# Patient Record
Sex: MALE | Race: BLACK OR AFRICAN AMERICAN | Employment: UNEMPLOYED | URBAN - METROPOLITAN AREA
[De-identification: names, ages, dates, MRNs, and addresses within clinical notes are randomized per-mention and may not be internally consistent; named-entity substitution may affect disease eponyms.]

---

## 2017-06-19 ENCOUNTER — APPOINTMENT (OUTPATIENT)
Dept: GENERAL RADIOLOGY | Age: 32
End: 2017-06-19
Attending: PHYSICIAN ASSISTANT
Payer: MEDICAID

## 2017-06-19 ENCOUNTER — HOSPITAL ENCOUNTER (EMERGENCY)
Age: 32
Discharge: HOME | End: 2017-06-19
Attending: EMERGENCY MEDICINE
Payer: MEDICAID

## 2017-06-19 VITALS
HEART RATE: 54 BPM | HEIGHT: 70 IN | BODY MASS INDEX: 19.04 KG/M2 | OXYGEN SATURATION: 99 % | WEIGHT: 133 LBS | RESPIRATION RATE: 12 BRPM | DIASTOLIC BLOOD PRESSURE: 79 MMHG | SYSTOLIC BLOOD PRESSURE: 114 MMHG | TEMPERATURE: 97.7 F

## 2017-06-19 DIAGNOSIS — S20.211A CONTUSION OF CHEST WALL, RIGHT, INITIAL ENCOUNTER: Primary | ICD-10-CM

## 2017-06-19 PROCEDURE — 74011250637 HC RX REV CODE- 250/637: Performed by: PHYSICIAN ASSISTANT

## 2017-06-19 PROCEDURE — 71101 X-RAY EXAM UNILAT RIBS/CHEST: CPT

## 2017-06-19 PROCEDURE — 99283 EMERGENCY DEPT VISIT LOW MDM: CPT

## 2017-06-19 RX ORDER — NAPROXEN 500 MG/1
500 TABLET ORAL 2 TIMES DAILY WITH MEALS
Qty: 20 TAB | Refills: 0 | Status: SHIPPED | OUTPATIENT
Start: 2017-06-19 | End: 2017-06-29

## 2017-06-19 RX ORDER — OXYCODONE AND ACETAMINOPHEN 5; 325 MG/1; MG/1
2 TABLET ORAL
Status: COMPLETED | OUTPATIENT
Start: 2017-06-19 | End: 2017-06-19

## 2017-06-19 RX ORDER — TRAMADOL HYDROCHLORIDE 50 MG/1
50 TABLET ORAL
Qty: 20 TAB | Refills: 0 | Status: SHIPPED | OUTPATIENT
Start: 2017-06-19 | End: 2018-05-15 | Stop reason: ALTCHOICE

## 2017-06-19 RX ORDER — ONDANSETRON 4 MG/1
4 TABLET, ORALLY DISINTEGRATING ORAL
Status: COMPLETED | OUTPATIENT
Start: 2017-06-19 | End: 2017-06-19

## 2017-06-19 RX ADMIN — OXYCODONE HYDROCHLORIDE AND ACETAMINOPHEN 2 TABLET: 5; 325 TABLET ORAL at 15:26

## 2017-06-19 RX ADMIN — ONDANSETRON 4 MG: 4 TABLET, ORALLY DISINTEGRATING ORAL at 15:26

## 2017-06-19 NOTE — ED NOTES
Emergency Department Nursing Plan of Care       The Nursing Plan of Care is developed from the Nursing assessment and Emergency Department Attending provider initial evaluation. The plan of care may be reviewed in the ED Provider note.     The Plan of Care was developed with the following considerations:   Patient / Family readiness to learn indicated by:verbalized understanding  Persons(s) to be included in education: patient  Barriers to Learning/Limitations:No    [address], RN    6/19/2017   2:45 PM    See Assessment

## 2017-06-19 NOTE — ED NOTES
Patient has been instructed that they have been given Percocet 2 Tabs PO* which contains opioids, benzodiazepines, or other sedating drugs. Patient is aware that they  will need to refrain from driving or operating heavy machinery after taking this medication. Patient also instructed that they need to avoid drinking alcohol and using other products containing opioids, benzodiazepines, or other sedating drugs. Patient verbalized understanding.

## 2017-06-19 NOTE — LETTER
[address] 
[facility] EMERGENCY DEPT 
[address] [place] [address] 
[phone] Work/School Note Date: 6/19/2017 To Whom It May concern: 
 
Suzanne Ramsey was seen and treated today in the emergency room by the following provider(s): 
Attending Provider: Lisette Tyson MD 
Physician Assistant: INOCENTE Zabala. Suzanne Ramsey may return to work on 06/22/2017. Sincerely, Caroline Thomas RN

## 2017-06-19 NOTE — ED PROVIDER NOTES
HPI     To ED for complaints of R chest pain. Started 2 days ago when playing basketball, was accidentally elbowed in the chest.  Since then has had increasing pain to right mid chest. Pain is sharp, constant, worse with deep breath, moving, twisting. Has not tried any medications for pain. No SOB, but cant take full inspiration due to resulting pain. No cough. No dizziness. No syncope. No other injury. Past Medical History:   Diagnosis Date    Anxiety     Bipolar 1 disorder ([facility]     Depression     Learning disorder     [facility]     Schizophrenia ([facility]        Past Surgical History:   Procedure Laterality Date    HX ORTHOPAEDIC      left foot         Family History:   Problem Relation Age of Onset    Diabetes Mother     Hypertension Mother     Psychiatric Disorder Mother     Mental Retardation Mother     Heart Disease Maternal Grandmother        Social History     Social History    Marital status: SINGLE     Spouse name: N/A    Number of children: N/A    Years of education: N/A     Occupational History    Not on file. Social History Main Topics    Smoking status: Current Every Day Smoker     Packs/day: 1.00     Types: Cigarettes    Smokeless tobacco: Never Used    Alcohol use No    Drug use: Yes     Special: Marijuana    Sexual activity: No     Other Topics Concern    Not on file     Social History Narrative         ALLERGIES: Review of patient's allergies indicates no known allergies. Review of Systems   Constitutional: Negative for chills and fever. HENT: Negative for congestion, rhinorrhea and sore throat. Eyes: Negative for pain and discharge. Respiratory: Negative for cough, chest tightness, shortness of breath and wheezing. Cardiovascular: Positive for chest pain. Negative for palpitations and leg swelling. Gastrointestinal: Negative for abdominal pain, nausea and vomiting. Genitourinary: Negative for dysuria, frequency and urgency. Musculoskeletal: Negative for back pain and neck pain. Skin: Negative for rash and wound. Neurological: Negative for seizures, syncope and headaches. Psychiatric/Behavioral: Negative for confusion. The patient is not nervous/anxious. All other systems reviewed and are negative. Vitals:    06/19/17 1403   BP: 117/75   Pulse: 70   Resp: 16   Temp: 98.1 °F (36.7 °C)   SpO2: 96%   Weight: 60.3 kg (133 lb)   Height: 5' 10\" (1.778 m)            Physical Exam   Constitutional: He is oriented to person, place, and time. He appears well-developed and well-nourished. HENT:   Head: Normocephalic and atraumatic. Right Ear: External ear normal.   Left Ear: External ear normal.   Nose: Nose normal.   Mouth/Throat: Oropharynx is clear and moist.   Eyes: Conjunctivae and EOM are normal. Pupils are equal, round, and reactive to light. Neck: Normal range of motion. Neck supple. Cardiovascular: Normal rate, regular rhythm and normal heart sounds. Pulmonary/Chest: Effort normal. He has no wheezes. He has no rales. Reproducible rib and chest tenderness to mid ribs, anterior, midclavicular line. No ecchymosis. No crepitus or palpable defect. No rash. Diminished BS bilaterally. Abdominal: Soft. Bowel sounds are normal. There is no tenderness. There is no rebound. Musculoskeletal: Normal range of motion. Neurological: He is alert and oriented to person, place, and time. He has normal reflexes. Skin: Skin is warm and dry. Psychiatric: He has a normal mood and affect. His behavior is normal.   Nursing note and vitals reviewed. MDM  Number of Diagnoses or Management Options  Contusion of chest wall, right, initial encounter:     ED Course       Procedures             Pt notes he is currently not taking any prescription medications. 350 PM - recheck - feeling better after medication    LABORATORY TESTS:  No results found for this or any previous visit (from the past 12 hour(s)).     IMAGING RESULTS:  XR RIBS RT W PA CXR MIN 3 V   Final Result          MEDICATIONS GIVEN:  Medications   oxyCODONE-acetaminophen (PERCOCET) 5-325 mg per tablet 2 Tab (2 Tabs Oral Given 17)   ondansetron (ZOFRAN ODT) tablet 4 mg (4 mg Oral Given 17 152)       IMPRESSION:  1. Contusion of chest wall, right, initial encounter        PLAN:  1. Current Discharge Medication List      START taking these medications    Details   naproxen (NAPROSYN) 500 mg tablet Take 1 Tab by mouth two (2) times daily (with meals) for 10 days. Qty: 20 Tab, Refills: 0      traMADol (ULTRAM) 50 mg tablet Take 1 Tab by mouth every six (6) hours as needed for Pain. Max Daily Amount: 200 mg.   Qty: 20 Tab, Refills: 0         STOP taking these medications       ibuprofen (MOTRIN) 800 mg tablet Comments:   Reason for Stopping:         HYDROcodone-acetaminophen (NORCO) 5-325 mg per tablet Comments:   Reason for Stopping:         LORazepam (ATIVAN) 0.5 mg tablet Comments:   Reason for Stoppin.   Follow-up Information     Follow up With Details Comments [address]   [address] [facility]   [address] [facility]  [phone]        Return to ED if worse

## 2017-06-19 NOTE — ED TRIAGE NOTES
Pt c/o right rib pain following playing basketball a week ago with shortness of breath. Pt states \"I feel like someone is poking a knife in my lung\".

## 2017-06-19 NOTE — DISCHARGE INSTRUCTIONS
Chest Contusion: Care Instructions  Your Care Instructions  A chest contusion, or bruise, is caused by a fall or direct blow to the chest. Car crashes, falls, getting punched, and injury from bicycle handlebars are common causes of chest contusions. A very forceful blow to the chest can injure the heart or blood vessels in the chest, the lungs, the airway, the liver, or the spleen. Pain may be caused by an injury to muscles, cartilage, or ribs. Deep breathing, coughing, or sneezing can increase your pain. Lying on the injured area also can cause pain. Follow-up care is a key part of your treatment and safety. Be sure to make and go to all appointments, and call your doctor if you are having problems. It's also a good idea to know your test results and keep a list of the medicines you take. How can you care for yourself at home? · Rest and protect the injured or sore area. Stop, change, or take a break from any activity that may be causing your pain. · Put ice or a cold pack on the area for 10 to 20 minutes at a time. Put a thin cloth between the ice and your skin. · After 2 or 3 days, if your swelling is gone, apply a heating pad set on low or a warm cloth to your chest. Some doctors suggest that you go back and forth between hot and cold. Put a thin cloth between the heating pad and your skin. · Do not wrap or tape your ribs for support. This may cause you to take smaller breaths, which could increase your risk of pneumonia and lung collapse. · Ask your doctor if you can take an over-the-counter pain medicine, such as acetaminophen (Tylenol), ibuprofen (Advil, Motrin), or naproxen (Aleve). Be safe with medicines. Read and follow all instructions on the label. · Take your medicines exactly as prescribed. Call your doctor if you think you are having a problem with your medicine.   · Gentle stretching and massage may help you feel better after a few days of rest. Stretch slowly to the point just before discomfort begins, then hold the stretch for at least 15 to 30 seconds. Do this 3 or 4 times per day. · As your pain gets better, slowly return to your normal activities. Be patient, because chest bruises can take weeks or months to heal. Any increased pain may be a sign that you need to rest a while longer. When should you call for help? Call 911 anytime you think you may need emergency care. For example, call if:  · You have severe trouble breathing. · You cough up blood. Call your doctor now or seek immediate medical care if:  · You have belly pain. · You are dizzy or lightheaded, or you feel like you may faint. · You develop new symptoms with the chest pain. · Your chest pain gets worse. · You have a fever. · You have some shortness of breath. · You have a cough that brings up mucus from the lungs. Watch closely for changes in your health, and be sure to contact your doctor if:  · Your chest pain is not improving after 1 week. Where can you learn more? Go to http://viola-beckie.info/. Enter I174 in the search box to learn more about \"Chest Contusion: Care Instructions. \"  Current as of: March 20, 2017  Content Version: 11.3  © 8569-5478 [employer]. Care instructions adapted under license by [employer] (which disclaims liability or warranty for this information). If you have questions about a medical condition or this instruction, always ask your healthcare professional. [place] [address] any warranty or liability for your use of this information.

## 2017-06-19 NOTE — ED NOTES
Attempted to assess patient a second time. Patient was not in room. Provider was notified. Triage was called. Did not receive answer from triage.

## 2017-06-19 NOTE — ED NOTES
Patient was in different room. Was showing in computer as being in room 9. Patient was actually in room 11.

## 2017-06-20 NOTE — ED NOTES
Patient (s)  given copy of dc instructions and 1 paper script(s) and 1 electronic scripts. Patient (s) verbalized understanding of instructions and script (s). Patient given a current medication reconciliation form and verbalized understanding of their medications. Patient (s) verbalized understanding of the importance of discussing medications with  his or her physician or clinic they will be following up with. Patient alert and oriented and in no acute distress. Patient offered wheelchair from treatment area to hospital entrance, patient declined wheelchair.

## 2018-05-15 ENCOUNTER — OFFICE VISIT (OUTPATIENT)
Dept: FAMILY MEDICINE CLINIC | Age: 33
End: 2018-05-15

## 2018-05-15 VITALS
HEIGHT: 71 IN | TEMPERATURE: 98.9 F | SYSTOLIC BLOOD PRESSURE: 117 MMHG | DIASTOLIC BLOOD PRESSURE: 72 MMHG | HEART RATE: 54 BPM | WEIGHT: 127.2 LBS | OXYGEN SATURATION: 99 % | BODY MASS INDEX: 17.81 KG/M2 | RESPIRATION RATE: 16 BRPM

## 2018-05-15 DIAGNOSIS — F31.60 BIPOLAR 1 DISORDER, MIXED (HCC): Primary | ICD-10-CM

## 2018-05-15 DIAGNOSIS — Z00.00 WELL ADULT EXAM: ICD-10-CM

## 2018-05-15 DIAGNOSIS — F32.A ANXIETY AND DEPRESSION: ICD-10-CM

## 2018-05-15 DIAGNOSIS — F41.9 ANXIETY AND DEPRESSION: ICD-10-CM

## 2018-05-15 DIAGNOSIS — M79.672 LEFT FOOT PAIN: ICD-10-CM

## 2018-05-15 DIAGNOSIS — F22 PARANOID BEHAVIOR (HCC): ICD-10-CM

## 2018-05-15 RX ORDER — MELOXICAM 15 MG/1
15 TABLET ORAL
Qty: 30 TAB | Refills: 1 | Status: SHIPPED | OUTPATIENT
Start: 2018-05-15

## 2018-05-15 RX ORDER — FLUOXETINE 10 MG/1
TABLET ORAL
Qty: 30 TAB | Refills: 1 | Status: SHIPPED | OUTPATIENT
Start: 2018-05-15 | End: 2018-08-27 | Stop reason: DRUGHIGH

## 2018-05-15 RX ORDER — QUETIAPINE FUMARATE 25 MG/1
25 TABLET, FILM COATED ORAL
Qty: 30 TAB | Refills: 1 | Status: SHIPPED | OUTPATIENT
Start: 2018-05-15 | End: 2018-08-27 | Stop reason: SDUPTHER

## 2018-05-15 NOTE — MR AVS SNAPSHOT
[address] [facility] 
[phone] Patient: Maria Luisa Hahn MRN: [id number] UES:9/24/1943 Visit Information Date & Time Provider Department Dept. Phone Encounter #  
 5/15/2018  2:30 PM Amara Gilliam MD [facility] at [address] [id number] Follow-up Instructions Return in about 6 weeks (around 6/26/2018). Upcoming Health Maintenance Date Due Pneumococcal 19-64 Medium Risk (1 of 1 - PPSV23) 11/22/2018* Influenza Age 5 to Adult 8/1/2018 DTaP/Tdap/Td series (2 - Td) 6/21/2024 *Topic was postponed. The date shown is not the original due date. Allergies as of 5/15/2018  Review Complete On: 5/15/2018 By: Amara Gilliam MD  
  
 Severity Noted Reaction Type Reactions Other Plant, Animal, Environmental  05/15/2018    Other (comments) Environmental Allergy-Stuffy Nose, Eyes Watery and Puffy Current Immunizations  Never Reviewed Name Date Hep B Vaccine 4/3/2015 Influenza Vaccine Intradermal PF 11/20/2014 Tdap 6/21/2014 Not reviewed this visit You Were Diagnosed With   
  
 Codes Comments Bipolar 1 disorder, mixed ([facility]    -  Primary ICD-10-CM: F31.60 ICD-9-CM: 296.60 Anxiety and depression     ICD-10-CM: F41.9, F32.9 ICD-9-CM: 300.00, 311 Paranoid behavior ([facility]     ICD-10-CM: F22 
ICD-9-CM: 297.8 Left foot pain     ICD-10-CM: D44.480 ICD-9-CM: 729.5 Well adult exam     ICD-10-CM: Z00.00 ICD-9-CM: V70.0 Vitals BP Pulse Temp Resp Height(growth percentile) Weight(growth percentile) 117/72 (BP 1 Location: Right arm, BP Patient Position: Sitting) (!) 54 98.9 °F (37.2 °C) (Oral) 16 5' 10.5\" (1.791 m) 127 lb 3.2 oz (57.7 kg) SpO2 BMI Smoking Status 99% 17.99 kg/m2 Current Every Day Smoker Vitals History BMI and BSA Data  Body Mass Index Body Surface Area  
 17.99 kg/m 2 1.69 m 2  
  
 Preferred Pharmacy Pharmacy Name Phone [facility] [address] [email or web address] [address] [facility] [[email or web address]] [phone] Your Updated Medication List  
  
   
This list is accurate as of 5/15/18  4:28 PM.  Always use your most recent med list.  
  
  
  
  
 FLUoxetine 10 mg tablet Commonly known as:  PROzac Take 1/2 tab by mouth daily x 1 week and then increase to 1 tab daily  
  
 meloxicam 15 mg tablet Commonly known as:  MOBIC Take 1 Tab by mouth daily (after breakfast). Take x 1 week and then stop. May use daily after this as needed. QUEtiapine 25 mg tablet Commonly known as:  SEROquel Take 1 Tab by mouth nightly. Prescriptions Sent to Pharmacy Refills  
 meloxicam (MOBIC) 15 mg tablet 1 Sig: Take 1 Tab by mouth daily (after breakfast). Take x 1 week and then stop. May use daily after this as needed. Class: Normal  
 Pharmacy: [facility] [address] AT [address] [place] [address] Ph #: [phone] Route: Oral  
 QUEtiapine (SEROQUEL) 25 mg tablet 1 Sig: Take 1 Tab by mouth nightly. Class: Normal  
 Pharmacy: [facility] [address] AT [address] [place] [address] Ph #: [phone] Route: Oral  
 FLUoxetine (PROZAC) 10 mg tablet 1 Sig: Take 1/2 tab by mouth daily x 1 week and then increase to 1 tab daily Class: Normal  
 Pharmacy: [facility] AT [address] [place] [address] Ph #: [phone] We Performed the Following 9-DRUG SCREEN + OXY, UR [id number] CPT(R)] METABOLIC PANEL, COMPREHENSIVE [37269 CPT(R)] REFERRAL TO PSYCHIATRY [REF91 Custom] Comments:  
 Please evaluate patient for: bipolar and schizophrenia hx  
 TSH 3RD GENERATION [88462 CPT(R)] Follow-up Instructions Return in about 6 weeks (around 6/26/2018). Referral Information Referral ID Referred By Referred To  
  
 [id number] Asia ARCE MD   
   [address] [place], [address] Phone: [phone] Fax: [phone] Visits Status Start Date End Date 1 New Request 5/15/18 5/15/19 If your referral has a status of pending review or denied, additional information will be sent to support the outcome of this decision. Introducing [facility] & HEALTH SERVICES! [employer] introduces RxVault.in patient portal. Now you can access parts of your medical record, email your doctor's office, and request medication refills online. 1. In your internet browser, go to [email or web address] 2. Click on the First Time User? Click Here link in the Sign In box. You will see the New Member Sign Up page. 3. Enter your RxVault.in Access Code exactly as it appears below. You will not need to use this code after youve completed the sign-up process. If you do not sign up before the expiration date, you must request a new code. · [email or web address] Access Code: [email or web address] Expires: 8/13/2018  4:28 PM 
 
4. Enter the last four digits of your Social Security Number (xxxx) and Date of Birth (mm/dd/yyyy) as indicated and click Submit. You will be taken to the next sign-up page. 5. Create a RxVault.in ID. This will be your RxVault.in login ID and cannot be changed, so think of one that is secure and easy to remember. 6. Create a RxVault.in password. You can change your password at any time. 7. Enter your Password Reset Question and Answer. This can be used at a later time if you forget your password. 8. Enter your e-mail address. You will receive e-mail notification when new information is available in [address]. 9. Click Sign Up. You can now view and download portions of your medical record. 10. Click the Download Summary menu link to download a portable copy of your medical information. If you have questions, please visit the Frequently Asked Questions section of the Food[employer] website. Remember, "[employer] is NOT to be used for urgent needs. For medical emergencies, dial 911. Now available from your iPhone and Android! Please provide this summary of care documentation to your next provider. Your primary care clinician is listed as Tiffanei Felder. If you have any questions after today's visit, please call [phone].
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
## 2018-05-15 NOTE — PROGRESS NOTES
Chief Complaint   Patient presents with    Establish Care     New Patient     Pneumonia Vaccine-when asked patient refused    Patient would like to prescribed medication for Mental  Problems-previous physician left practice/out of State  Room #8

## 2018-05-16 LAB
ALBUMIN SERPL-MCNC: 5.1 G/DL (ref 3.5–5.5)
ALBUMIN/GLOB SERPL: 1.8 {RATIO} (ref 1.2–2.2)
ALP SERPL-CCNC: 75 IU/L (ref 39–117)
ALT SERPL-CCNC: 14 IU/L (ref 0–44)
AMPHETAMINES UR QL SCN: NEGATIVE NG/ML
AST SERPL-CCNC: 20 IU/L (ref 0–40)
BARBITURATES UR QL SCN: NEGATIVE NG/ML
BENZODIAZ UR QL SCN: NEGATIVE NG/ML
BILIRUB SERPL-MCNC: 0.7 MG/DL (ref 0–1.2)
BUN SERPL-MCNC: 15 MG/DL (ref 6–20)
BUN/CREAT SERPL: 15 (ref 9–20)
BZE UR QL SCN: NEGATIVE NG/ML
CALCIUM SERPL-MCNC: 10 MG/DL (ref 8.7–10.2)
CANNABINOIDS UR QL SCN: POSITIVE NG/ML
CHLORIDE SERPL-SCNC: 97 MMOL/L (ref 96–106)
CO2 SERPL-SCNC: 24 MMOL/L (ref 18–29)
CREAT SERPL-MCNC: 1 MG/DL (ref 0.76–1.27)
CREAT UR-MCNC: 340.4 MG/DL (ref 20–300)
GFR SERPLBLD CREATININE-BSD FMLA CKD-EPI: 115 ML/MIN/1.73
GFR SERPLBLD CREATININE-BSD FMLA CKD-EPI: 99 ML/MIN/1.73
GLOBULIN SER CALC-MCNC: 2.9 G/DL (ref 1.5–4.5)
GLUCOSE SERPL-MCNC: 81 MG/DL (ref 65–99)
METHADONE UR QL SCN: NEGATIVE NG/ML
OPIATES UR QL SCN: NEGATIVE NG/ML
OXYCODONE+OXYMORPHONE UR QL SCN: NEGATIVE NG/ML
PCP UR QL: NEGATIVE NG/ML
PH UR: 5.6 [PH] (ref 4.5–8.9)
PLEASE NOTE:, 733163: ABNORMAL
POTASSIUM SERPL-SCNC: 4.1 MMOL/L (ref 3.5–5.2)
PROPOXYPH UR QL SCN: NEGATIVE NG/ML
PROT SERPL-MCNC: 8 G/DL (ref 6–8.5)
SODIUM SERPL-SCNC: 139 MMOL/L (ref 134–144)
TSH SERPL DL<=0.005 MIU/L-ACNC: 1.23 UIU/ML (ref 0.45–4.5)

## 2018-08-27 ENCOUNTER — OFFICE VISIT (OUTPATIENT)
Dept: FAMILY MEDICINE CLINIC | Age: 33
End: 2018-08-27

## 2018-08-27 VITALS
WEIGHT: 133.2 LBS | SYSTOLIC BLOOD PRESSURE: 129 MMHG | RESPIRATION RATE: 16 BRPM | TEMPERATURE: 97.9 F | HEART RATE: 54 BPM | DIASTOLIC BLOOD PRESSURE: 80 MMHG | OXYGEN SATURATION: 97 % | HEIGHT: 71 IN | BODY MASS INDEX: 18.65 KG/M2

## 2018-08-27 DIAGNOSIS — F32.A ANXIETY AND DEPRESSION: ICD-10-CM

## 2018-08-27 DIAGNOSIS — F22 PARANOID BEHAVIOR (HCC): ICD-10-CM

## 2018-08-27 DIAGNOSIS — F31.60 BIPOLAR 1 DISORDER, MIXED (HCC): ICD-10-CM

## 2018-08-27 DIAGNOSIS — F41.9 ANXIETY AND DEPRESSION: ICD-10-CM

## 2018-08-27 RX ORDER — FLUOXETINE HYDROCHLORIDE 20 MG/1
20 CAPSULE ORAL DAILY
Qty: 30 CAP | Refills: 2 | Status: SHIPPED | OUTPATIENT
Start: 2018-08-27 | End: 2018-11-12 | Stop reason: SDUPTHER

## 2018-08-27 RX ORDER — QUETIAPINE FUMARATE 50 MG/1
50 TABLET, FILM COATED ORAL
Qty: 30 TAB | Refills: 1 | Status: SHIPPED | OUTPATIENT
Start: 2018-08-27 | End: 2018-11-12 | Stop reason: SDUPTHER
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
## 2018-08-27 NOTE — MR AVS SNAPSHOT
[address] [facility] 
[phone] Patient: Sophia Kinney MRN: [id number] [facility]:0/78/1026 Visit Information Date & Time Provider Department Dept. Phone Encounter #  
 8/27/2018  9:30 AM Antonette Yee MD [facility] at [address] [id number] Follow-up Instructions Return in about 6 weeks (around 10/8/2018), or if symptoms worsen or fail to improve. Your Appointments 11/12/2018  1:00 PM  
New Patient with Tigre Riojas MD  
[facility]) Appt Note: new pt for bipolar 1 disorder; referred by Wilda Jhaveri; pt  made appt; told to arrive at 12:30  
 [address] [facility] [address] [facility] [address]  
  
   
 [address] [place] [address] [address] Upcoming Health Maintenance Date Due Pneumococcal 19-64 Medium Risk (1 of 1 - PPSV23) 11/22/2018* Influenza Age 5 to Adult 12/15/2018* DTaP/Tdap/Td series (2 - Td) 6/21/2024 *Topic was postponed. The date shown is not the original due date. Allergies as of 8/27/2018  Review Complete On: 8/27/2018 By: Antonette Yee MD  
  
 Severity Noted Reaction Type Reactions Other Plant, Animal, Environmental  05/15/2018    Other (comments) Environmental Allergy-Stuffy Nose, Eyes Watery and Puffy Current Immunizations  Never Reviewed Name Date Hep B Vaccine 4/3/2015 Influenza Vaccine Intradermal PF 11/20/2014 Tdap 6/21/2014 Not reviewed this visit You Were Diagnosed With   
  
 Codes Comments Bipolar 1 disorder, mixed ([facility] [address]     ICD-10-CM: F31.60 ICD-9-CM: 296.60 Anxiety and depression     ICD-10-CM: F41.9, F32.9 ICD-9-CM: 300.00, 311 Paranoid behavior ([facility] [address]     ICD-10-CM: F22 
ICD-9-CM: 297.8 Vitals BP Pulse Temp Resp Height(growth percentile) Weight(growth percentile) 129/80 (BP 1 Location: Right arm, BP Patient Position: Sitting) (!) 54 97.9 °F (36.6 °C) (Oral) 16 5' 10.5\" (1.791 m) 133 lb 3.2 oz (60.4 kg) SpO2 BMI Smoking Status 97% 18.84 kg/m2 Current Every Day Smoker Vitals History BMI and BSA Data Body Mass Index Body Surface Area  
 18.84 kg/m 2 1.73 m 2 Preferred Pharmacy Pharmacy Name Phone [facility] [address] [facility] [phone] Your Updated Medication List  
  
   
This list is accurate as of 8/27/18 10:39 AM.  Always use your most recent med list.  
  
  
  
  
 FLUoxetine 20 mg capsule Commonly known as:  PROzac Take 1 Cap by mouth daily. meloxicam 15 mg tablet Commonly known as:  MOBIC Take 1 Tab by mouth daily (after breakfast). Take x 1 week and then stop. May use daily after this as needed. QUEtiapine 50 mg tablet Commonly known as:  SEROquel Take 1 Tab by mouth nightly. Prescriptions Sent to Pharmacy Refills QUEtiapine (SEROQUEL) 50 mg tablet 1 Sig: Take 1 Tab by mouth nightly. Class: Normal  
 Pharmacy: [facility] [place] [address] Ph #: [phone] Route: Oral  
 FLUoxetine (PROZAC) 20 mg capsule 2 Sig: Take 1 Cap by mouth daily. Class: Normal  
 Pharmacy: [facility] [place] [address] Ph #: [phone] Route: Oral  
  
Follow-up Instructions Return in about 6 weeks (around 10/8/2018), or if symptoms worsen or fail to improve. Introducing [facility] & HEALTH SERVICES! [facility] introduces Webs patient portal. Now you can access parts of your medical record, email your doctor's office, and request medication refills online. 1. In your internet browser, go to [email or web address] 2. Click on the First Time User? Click Here link in the Sign In box. You will see the New Member Sign Up page. 3. Enter your Muse Access Code exactly as it appears below. You will not need to use this code after youve completed the sign-up process. If you do not sign up before the expiration date, you must request a new code. · Muse Access Code: [email or web address] Expires: 11/25/2018  9:56 AM 
 
4. Enter the last four digits of your Social Security Number (xxxx) and Date of Birth (mm/dd/yyyy) as indicated and click Submit. You will be taken to the next sign-up page. 5. Create a Muse ID. This will be your Muse login ID and cannot be changed, so think of one that is secure and easy to remember. 6. Create a Muse password. You can change your password at any time. 7. Enter your Password Reset Question and Answer. This can be used at a later time if you forget your password. 8. Enter your e-mail address. You will receive e-mail notification when new information is available in [address] 9. Click Sign Up. You can now view and download portions of your medical record. 10. Click the Download Summary menu link to download a portable copy of your medical information. If you have questions, please visit the Frequently Asked Questions section of the Muse website. Remember, Muse is NOT to be used for urgent needs. For medical emergencies, dial 911. Now available from your iPhone and Android! Please provide this summary of care documentation to your next provider. Your primary care clinician is listed as Lucio Griffith. If you have any questions after today's visit, please call [phone].

## 2018-08-27 NOTE — PROGRESS NOTES
Chief Complaint   Patient presents with    Bipolar     3 month follow-up   1. Have you been to the ER, urgent care clinic since your last visit? Hospitalized since your last visit? No    2. Have you seen or consulted any other health care providers outside of the [address] [place] [address] since your last visit? Include any pap smears or colon screening.  No   Appointment with Dr Freddy Garcia scheduled 11/2/18  Room 8

## 2018-11-12 ENCOUNTER — OFFICE VISIT (OUTPATIENT)
Dept: BEHAVIORAL/MENTAL HEALTH CLINIC | Age: 33
End: 2018-11-12

## 2018-11-12 VITALS
HEIGHT: 71 IN | SYSTOLIC BLOOD PRESSURE: 127 MMHG | BODY MASS INDEX: 19.35 KG/M2 | HEART RATE: 75 BPM | DIASTOLIC BLOOD PRESSURE: 99 MMHG | WEIGHT: 138.2 LBS

## 2018-11-12 DIAGNOSIS — F20.0 PARANOID SCHIZOPHRENIA (HCC): ICD-10-CM

## 2018-11-12 DIAGNOSIS — F41.9 ANXIETY AND DEPRESSION: ICD-10-CM

## 2018-11-12 DIAGNOSIS — F81.9 LEARNING DISORDER: Primary | ICD-10-CM

## 2018-11-12 DIAGNOSIS — F32.A ANXIETY AND DEPRESSION: ICD-10-CM

## 2018-11-12 PROBLEM — F20.9 SCHIZOPHRENIA (HCC): Status: ACTIVE | Noted: 2018-11-12

## 2018-11-12 RX ORDER — FLUOXETINE HYDROCHLORIDE 20 MG/1
20 CAPSULE ORAL
Qty: 30 CAP | Refills: 2 | Status: SHIPPED | OUTPATIENT
Start: 2018-11-12

## 2018-11-12 RX ORDER — DOXEPIN HYDROCHLORIDE 25 MG/1
25 CAPSULE ORAL
Qty: 30 CAP | Refills: 1 | Status: SHIPPED | OUTPATIENT
Start: 2018-11-12

## 2018-11-12 RX ORDER — QUETIAPINE FUMARATE 100 MG/1
100 TABLET, FILM COATED ORAL
Qty: 30 TAB | Refills: 1 | Status: SHIPPED | OUTPATIENT
Start: 2018-11-12
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
## 2018-12-28 ENCOUNTER — HOSPITAL ENCOUNTER (EMERGENCY)
Age: 33
Discharge: HOME | End: 2018-12-28
Attending: EMERGENCY MEDICINE
Payer: MEDICAID

## 2018-12-28 VITALS
RESPIRATION RATE: 16 BRPM | TEMPERATURE: 98.2 F | DIASTOLIC BLOOD PRESSURE: 76 MMHG | OXYGEN SATURATION: 98 % | HEART RATE: 83 BPM | BODY MASS INDEX: 19.26 KG/M2 | WEIGHT: 130 LBS | SYSTOLIC BLOOD PRESSURE: 130 MMHG | HEIGHT: 69 IN

## 2018-12-28 DIAGNOSIS — N30.00 ACUTE CYSTITIS WITHOUT HEMATURIA: Primary | ICD-10-CM

## 2018-12-28 LAB
APPEARANCE UR: ABNORMAL
BACTERIA URNS QL MICRO: NEGATIVE /HPF
BILIRUB UR QL: NEGATIVE
COLOR UR: ABNORMAL
EPITH CASTS URNS QL MICRO: ABNORMAL /LPF
GLUCOSE UR STRIP.AUTO-MCNC: NEGATIVE MG/DL
HGB UR QL STRIP: NEGATIVE
KETONES UR QL STRIP.AUTO: NEGATIVE MG/DL
LEUKOCYTE ESTERASE UR QL STRIP.AUTO: ABNORMAL
NITRITE UR QL STRIP.AUTO: NEGATIVE
PH UR STRIP: 6.5 [PH] (ref 5–8)
PROT UR STRIP-MCNC: NEGATIVE MG/DL
RBC #/AREA URNS HPF: ABNORMAL /HPF (ref 0–5)
SP GR UR REFRACTOMETRY: 1.02 (ref 1–1.03)
UA: UC IF INDICATED,UAUC: ABNORMAL
UROBILINOGEN UR QL STRIP.AUTO: 1 EU/DL (ref 0.2–1)
WBC URNS QL MICRO: ABNORMAL /HPF (ref 0–4)

## 2018-12-28 PROCEDURE — 81001 URINALYSIS AUTO W/SCOPE: CPT

## 2018-12-28 PROCEDURE — 99283 EMERGENCY DEPT VISIT LOW MDM: CPT

## 2018-12-28 PROCEDURE — 96372 THER/PROPH/DIAG INJ SC/IM: CPT

## 2018-12-28 PROCEDURE — 87086 URINE CULTURE/COLONY COUNT: CPT

## 2018-12-28 PROCEDURE — 87491 CHLMYD TRACH DNA AMP PROBE: CPT

## 2018-12-28 PROCEDURE — 74011250636 HC RX REV CODE- 250/636: Performed by: PHYSICIAN ASSISTANT

## 2018-12-28 PROCEDURE — 74011250637 HC RX REV CODE- 250/637: Performed by: PHYSICIAN ASSISTANT

## 2018-12-28 RX ORDER — CEPHALEXIN 500 MG/1
500 CAPSULE ORAL 2 TIMES DAILY
Qty: 14 CAP | Refills: 0 | Status: SHIPPED | OUTPATIENT
Start: 2018-12-28 | End: 2019-01-04

## 2018-12-28 RX ORDER — AZITHROMYCIN 500 MG/1
1000 TABLET, FILM COATED ORAL
Status: COMPLETED | OUTPATIENT
Start: 2018-12-28 | End: 2018-12-28

## 2018-12-28 RX ADMIN — LIDOCAINE HYDROCHLORIDE 250 MG: 10 INJECTION, SOLUTION EPIDURAL; INFILTRATION; INTRACAUDAL; PERINEURAL at 16:29

## 2018-12-28 RX ADMIN — AZITHROMYCIN 1000 MG: 500 TABLET, FILM COATED ORAL at 16:29

## 2018-12-28 NOTE — ED PROVIDER NOTES
EMERGENCY DEPARTMENT HISTORY AND PHYSICAL EXAM 
 
 
Date: 12/28/2018 Patient Name: Nyla Anderson History of Presenting Illness Chief Complaint Patient presents with  Penile Discharge History Provided By: Patient HPI: Nyla Anderson, 29 y.o. male with PMHx significant for depression, anxiety, bipolar ds, schizophrenia, ADHD presents ambulatory to the ED with cc of white penile discharge with assoc dysuria x 2 days. Denies hematuria, genital lesions Admits to unprotected intercourse. Has not taken anything for sx. There are no other complaints, changes, or physical findings at this time. PCP: Tejal Velazquez MD 
 
No current facility-administered medications on file prior to encounter. Current Outpatient Medications on File Prior to Encounter Medication Sig Dispense Refill  QUEtiapine (SEROQUEL) 100 mg tablet Take 1 Tab by mouth nightly. 30 Tab 1  
 FLUoxetine (PROZAC) 20 mg capsule Take 1 Cap by mouth daily (after breakfast). 30 Cap 2  
 doxepin (SINEQUAN) 25 mg capsule Take 1 Cap by mouth nightly. 30 Cap 1  
 meloxicam (MOBIC) 15 mg tablet Take 1 Tab by mouth daily (after breakfast). Take x 1 week and then stop. May use daily after this as needed. 30 Tab 1 Past History Past Medical History: 
Past Medical History:  
Diagnosis Date  ADHD   
 diagnosed at age 8yo  Anxiety  Bipolar 1 disorder ([address]  Depression  Learning disorder  [facility] [facility]  Schizophrenia ([facility] [address] Past Surgical History: 
Past Surgical History:  
Procedure Laterality Date  HX ORTHOPAEDIC    
 left foot Family History: 
Family History Problem Relation Age of Onset  Diabetes Mother  Hypertension Mother  Psychiatric Disorder Mother  Mental Retardation Mother  Heart Disease Maternal Grandmother  Hypertension Father  No Known Problems Sister  No Known Problems Brother  No Known Problems Sister Social History: 
Social History Tobacco Use  Smoking status: Current Every Day Smoker Packs/day: 1.00 Types: Cigarettes  Smokeless tobacco: Never Used Substance Use Topics  Alcohol use: No  
 Drug use: No  
 
 
Allergies: Allergies Allergen Reactions  Other Plant, Animal, Environmental Other (comments) Environmental Allergy-Stuffy Nose, Eyes Watery and Puffy Review of Systems Review of Systems Constitutional: Negative for chills and fever. HENT: Negative for facial swelling. Eyes: Negative for photophobia and visual disturbance. Respiratory: Negative for shortness of breath. Cardiovascular: Negative for chest pain. Gastrointestinal: Negative for abdominal pain, nausea and vomiting. Genitourinary: Positive for discharge and dysuria. Negative for flank pain, frequency, genital sores, hematuria, penile pain and penile swelling. Skin: Negative for color change, pallor, rash and wound. Neurological: Negative for dizziness, weakness, light-headedness and headaches. All other systems reviewed and are negative. Physical Exam  
Physical Exam  
Constitutional: He is oriented to person, place, and time. He appears well-developed and well-nourished. No distress. HENT:  
Head: Normocephalic and atraumatic. Eyes: Conjunctivae are normal.  
Cardiovascular: Normal rate, regular rhythm and normal heart sounds. Pulmonary/Chest: Effort normal and breath sounds normal. No respiratory distress. Abdominal: Soft. Bowel sounds are normal. There is no tenderness. Musculoskeletal: Normal range of motion. Neurological: He is alert and oriented to person, place, and time. No cranial nerve deficit. Skin: Skin is warm. No rash noted. Psychiatric: He has a normal mood and affect. His behavior is normal.  
Nursing note and vitals reviewed. Diagnostic Study Results Labs - Recent Results (from the past 12 hour(s)) URINALYSIS W/ REFLEX CULTURE  
 Collection Time: 12/28/18  4:23 PM  
Result Value Ref Range Color YELLOW/STRAW Appearance CLOUDY (A) CLEAR Specific gravity 1.025 1.003 - 1.030    
 pH (UA) 6.5 5.0 - 8.0 Protein NEGATIVE  NEG mg/dL Glucose NEGATIVE  NEG mg/dL Ketone NEGATIVE  NEG mg/dL Bilirubin NEGATIVE  NEG Blood NEGATIVE  NEG Urobilinogen 1.0 0.2 - 1.0 EU/dL Nitrites NEGATIVE  NEG Leukocyte Esterase LARGE (A) NEG    
 WBC  0 - 4 /hpf  
 RBC 0-5 0 - 5 /hpf Epithelial cells MODERATE (A) FEW /lpf Bacteria NEGATIVE  NEG /hpf  
 UA:UC IF INDICATED URINE CULTURE ORDERED (A) CNI Radiologic Studies - No orders to display CT Results  (Last 48 hours) None CXR Results  (Last 48 hours) None Medical Decision Making I am the first provider for this patient. I reviewed the vital signs, available nursing notes, past medical history, past surgical history, family history and social history. Vital Signs-Reviewed the patient's vital signs. Patient Vitals for the past 12 hrs: 
 Temp Pulse Resp BP SpO2  
12/28/18 1612 98.2 °F (36.8 °C) 83 16 130/76 98 % Records Reviewed: Nursing Notes and Old Medical Records Provider Notes (Medical Decision Making): DDx: Gonorrhea, Chlamydia, Trich, UTI 
 
 
ED Course:  
Initial assessment performed. The patients presenting problems have been discussed, and they are in agreement with the care plan formulated and outlined with them. I have encouraged them to ask questions as they arise throughout their visit. Disposition: 
Discussed lab results with pt along with dx and treatment plan. Discussed importance of PCP follow up. All questions answered. Pt voiced they understood. Return if sx worsen. PLAN: 
1. Discharge Medication List as of 12/28/2018  4:44 PM  
  
START taking these medications  Details  
cephALEXin (KEFLEX) 500 mg capsule Take 1 Cap by mouth two (2) times a day for 7 days. , Normal, Disp-14 Cap, R-0  
  
  
CONTINUE these medications which have NOT CHANGED Details QUEtiapine (SEROQUEL) 100 mg tablet Take 1 Tab by mouth nightly., Normal, Disp-30 Tab, R-1  
  
FLUoxetine (PROZAC) 20 mg capsule Take 1 Cap by mouth daily (after breakfast). , Normal, Disp-30 Cap, R-2  
  
doxepin (SINEQUAN) 25 mg capsule Take 1 Cap by mouth nightly., Normal, Disp-30 Cap, R-1  
  
meloxicam (MOBIC) 15 mg tablet Take 1 Tab by mouth daily (after breakfast). Take x 1 week and then stop. May use daily after this as needed., Normal, Disp-30 Tab, R-1  
  
  
 
2. Follow-up Information Follow up With Specialties Details Why Contact Info Nisha Garcia MD Family Practice Schedule an appointment as soon as possible for a visit As needed [address] [place] [address] 
[phone] Return to ED if worse Diagnosis Clinical Impression: 1. Acute cystitis without hematuria

## 2018-12-28 NOTE — DISCHARGE INSTRUCTIONS
Exposure to Sexually Transmitted Infections: Care Instructions  Your Care Instructions  Sexually transmitted infections (STIs) are those diseases spread by sexual contact. There are at least 20 different STIs, including chlamydia, gonorrhea, syphilis, and human immunodeficiency virus (HIV), which causes AIDS. Bacteria-caused STIs can be treated and cured. STIs caused by viruses, such as HIV, can be treated but not cured. Some STIs can reduce a woman's chances of getting pregnant in the future. STIs are spread during sexual contact, such as vaginal intercourse and oral or anal sex. Follow-up care is a key part of your treatment and safety. Be sure to make and go to all appointments, and call your doctor if you are having problems. It's also a good idea to know your test results and keep a list of the medicines you take. How can you care for yourself at home? · Your doctor may have given you a shot of antibiotics. If your doctor prescribed antibiotic pills, take them as directed. Do not stop taking them just because you feel better. You need to take the full course of antibiotics. · Do not have sexual contact while you have symptoms of an STI or are being treated for an STI. · Tell your sex partner (or partners) that he or she will need treatment. · If you are a woman, do not douche. Douching changes the normal balance of bacteria in the vagina and may spread an infection up into your reproductive organs. To prevent exposure to STIs in the future  · Use latex condoms every time you have sex. Use them from the beginning to the end of sexual contact. · Talk to your partner before you have sex. Find out if he or she has or is at risk for any STI. Keep in mind that a person may be able to spread an STI even if he or she does not have symptoms. · Do not have sex if you are being treated for an STI. · Do not have sex with anyone who has symptoms of an STI, such as sores on the genitals or mouth.   · Having one sex partner (who does not have STIs and does not have sex with anyone else) is a good way to avoid STIs. When should you call for help? Call your doctor now or seek immediate medical care if:    · You have new pain in your belly or pelvis.     · You have symptoms of a urinary tract infection. These may include:  ? Pain or burning when you urinate. ? A frequent need to urinate without being able to pass much urine. ? Pain in the flank, which is just below the rib cage and above the waist on either side of the back. ? Blood in your urine. ? A fever.     · You have new or worsening pain or swelling in the scrotum.    Watch closely for changes in your health, and be sure to contact your doctor if:    · You have unusual vaginal bleeding.     · You have a discharge from the vagina or penis.     · You have any new symptoms, such as sores, bumps, rashes, blisters, or warts.     · You have itching, tingling, pain, or burning in the genital or anal area.     · You think you may have an STI. Where can you learn more? Go to http://viola-beckie.info/. Enter G219 in the search box to learn more about \"Exposure to Sexually Transmitted Infections: Care Instructions. \"  Current as of: November 27, 2017  Content Version: 11.8  © 4637-8088 [employer]. Care instructions adapted under license by [employer] (which disclaims liability or warranty for this information). If you have questions about a medical condition or this instruction, always ask your healthcare professional. [place] [address] any warranty or liability for your use of this information. Urinary Tract Infections in Men: Care Instructions  Your Care Instructions    A urinary tract infection, or UTI, is a general term for an infection anywhere between the kidneys and the tip of the penis. UTIs can also be a result of a prostate problem. Most cause pain or burning when you urinate.   Most UTIs are caused by bacteria and can be cured with antibiotics. It is important to complete your treatment so that the infection does not get worse. Follow-up care is a key part of your treatment and safety. Be sure to make and go to all appointments, and call your doctor if you are having problems. It's also a good idea to know your test results and keep a list of the medicines you take. How can you care for yourself at home? · Take your antibiotics as prescribed. Do not stop taking them just because you feel better. You need to take the full course of antibiotics. · Take your medicines exactly as prescribed. Your doctor may have prescribed a medicine, such as phenazopyridine (Pyridium), to help relieve pain when you urinate. This turns your urine orange. You may stop taking it when your symptoms get better. But be sure to take all of your antibiotics, which treat the infection. · Drink extra water for the next day or two. This will help make the urine less concentrated and help wash out the bacteria causing the infection. (If you have kidney, heart, or liver disease and have to limit your fluids, talk with your doctor before you increase your fluid intake.)  · Avoid drinks that are carbonated or have caffeine. They can irritate the bladder. · Urinate often. Try to empty your bladder each time. · To relieve pain, take a hot bath or lay a heating pad (set on low) over your lower belly or genital area. Never go to sleep with a heating pad in place. To help prevent UTIs  · Drink plenty of fluids, enough so that your urine is light yellow or clear like water. If you have kidney, heart, or liver disease and have to limit fluids, talk with your doctor before you increase the amount of fluids you drink. · Urinate when you have the urge. Do not hold your urine for a long time. Urinate before you go to sleep. · Keep your penis clean.   Catheter care  If you have a drainage tube (catheter) in place, the following steps will help you care for it. · Always wash your hands before and after touching your catheter. · Check the area around the urethra for inflammation or signs of infection. Signs of infection include irritated, swollen, red, or tender skin, or pus around the catheter. · Clean the area around the catheter with soap and water two times a day. Dry with a clean towel afterward. · Do not apply powder or lotion to the skin around the catheter. To empty the urine collection bag  · Wash your hands with soap and water. · Without touching the drain spout, remove the spout from its sleeve at the bottom of the collection bag. Open the valve on the spout. · Let the urine flow out of the bag and into the toilet or a container. Do not let the tubing or drain spout touch anything. · After you empty the bag, clean the end of the drain spout with tissue and water. Close the valve and put the drain spout back into its sleeve at the bottom of the collection bag. · Wash your hands with soap and water. When should you call for help? Call your doctor now or seek immediate medical care if:    · Symptoms such as a fever, chills, nausea, or vomiting get worse or happen for the first time.     · You have new pain in your back just below your rib cage. This is called flank pain.     · There is new blood or pus in your urine.     · You are not able to take or keep down your antibiotics.    Watch closely for changes in your health, and be sure to contact your doctor if:    · You are not getting better after taking an antibiotic for 2 days.     · Your symptoms go away but then come back. Where can you learn more? Go to http://viola-beckie.info/. Enter A193 in the search box to learn more about \"Urinary Tract Infections in Men: Care Instructions. \"  Current as of: March 21, 2018  Content Version: 11.8  © 6649-0459 [employer].  Care instructions adapted under license by [employer] (which disclaims liability or warranty for this information). If you have questions about a medical condition or this instruction, always ask your healthcare professional. [address] any warranty or liability for your use of this information.

## 2018-12-28 NOTE — LETTER
1/4/2019 Bean Christensen 
[address] [facility] [address] [phone] Dear Mr. Dayanara Glynn You were seen in the Emergency Department of [address] [place] [address] on 12/28/2018 and had lab and/or radiology tests performed. The gonorrhea from your Emergency Department visit on 12/28/2018 was positive. You were treated appropriately during your visit. No further treatment is required. If you have not improved or are worsening, please follow up with your primary care doctor or Emergency department as soon as possible. Your partner needs to be treated. If you have any questions please contact the Emergency Department at [phone]. Sincerely, Lidia Jennings S [address] [place] [address] - [facility] 
[address] [facility] [address] [phone] [phone]

## 2018-12-28 NOTE — ED NOTES
Assumed care of patient from triage. Patient alert and oriented x4. Patient reports penile discharge x2 days with concerns for STDs. Denies any other complaints at this time. Emergency Department Nursing Plan of Care The Nursing Plan of Care is developed from the Nursing assessment and Emergency Department Attending provider initial evaluation. The plan of care may be reviewed in the ED Provider note. The Plan of Care was developed with the following considerations:  
Patient / Family readiness to learn indicated by:verbalized understanding Persons(s) to be included in education: patient Barriers to Learning/Limitations:No 
 
Signed Juanita Whitlock RN   
12/28/2018   4:24 PM

## 2018-12-30 LAB
BACTERIA SPEC CULT: NORMAL
CC UR VC: NORMAL
SERVICE CMNT-IMP: NORMAL

## 2019-01-04 LAB
C TRACH DNA SPEC QL NAA+PROBE: NEGATIVE
N GONORRHOEA DNA SPEC QL NAA+PROBE: POSITIVE
SAMPLE TYPE: ABNORMAL
SERVICE CMNT-IMP: ABNORMAL
SPECIMEN SOURCE: ABNORMAL

## 2021-05-25 ENCOUNTER — HOSPITAL ENCOUNTER (EMERGENCY)
Age: 36
Discharge: HOME | End: 2021-05-25
Attending: EMERGENCY MEDICINE
Payer: MEDICAID

## 2021-05-25 VITALS
OXYGEN SATURATION: 98 % | WEIGHT: 130 LBS | RESPIRATION RATE: 18 BRPM | HEART RATE: 61 BPM | DIASTOLIC BLOOD PRESSURE: 64 MMHG | BODY MASS INDEX: 19.26 KG/M2 | HEIGHT: 69 IN | SYSTOLIC BLOOD PRESSURE: 122 MMHG | TEMPERATURE: 98.1 F

## 2021-05-25 DIAGNOSIS — H10.32 ACUTE BACTERIAL CONJUNCTIVITIS OF LEFT EYE: Primary | ICD-10-CM

## 2021-05-25 PROCEDURE — 99282 EMERGENCY DEPT VISIT SF MDM: CPT

## 2021-05-25 RX ORDER — POLYMYXIN B SULFATE AND TRIMETHOPRIM 1; 10000 MG/ML; [USP'U]/ML
1 SOLUTION OPHTHALMIC EVERY 4 HOURS
Qty: 10 ML | Refills: 0 | Status: SHIPPED | OUTPATIENT
Start: 2021-05-25

## 2021-05-25 NOTE — Clinical Note
[facility] EMERGENCY DEPT 
[address] [facility] [address] 
[phone] Work/School Note Date: 5/25/2021 To Whom It May concern: 
 
Bull Turner was seen and treated today in the emergency room by the following provider(s): 
Attending Provider: Ángela Tavares MD 
Nurse Practitioner: Juliet Perales NP. Bull Turner is excused from work/school on 05/25/21 and 05/26/21. He is medically clear to return to work/school on 5/27/2021. Sincerely, Eleno Warren NP

## 2021-05-25 NOTE — DISCHARGE INSTRUCTIONS
It was a pleasure taking care of you in our Emergency Department today. We know that when you come to [address] [place] [address], you are entrusting us with your health, comfort, and safety. Our physicians and nurses honor that trust, and truly appreciate the opportunity to care for you and your loved ones. We also value your feedback. If you receive a survey about your Emergency Department experience today, please fill it out. We care about our patients' feedback, and we listen to what you have to say. Thank you!

## 2021-05-25 NOTE — ED PROVIDER NOTES
EMERGENCY DEPARTMENT HISTORY AND PHYSICAL EXAM    Date: 5/25/2021  Patient Name: Quin Zhou    History of Presenting Illness     Chief Complaint   Patient presents with    Eye Pain         History Provided By: Patient      HPI: Quin Zhou is a 29 y.o. male with a PMH of Depression, anxiety, bipolar, schizophrenia who presents with eye pain. Patient states symptoms began 2 days ago. States he awoke with pain in his eye. Associated sx include itching, bruning and drainage   Reports trying clear eyes with no relief. Hx of seasonal allergies and reports being outside prior to sx onset . Denies injury to the eye, make-up use, eye contact. Denies fever, chills, nasal symptoms. PCP: Nita Butcher MD    Current Outpatient Medications   Medication Sig Dispense Refill    QUEtiapine (SEROQUEL) 100 mg tablet Take 1 Tab by mouth nightly. 30 Tab 1    FLUoxetine (PROZAC) 20 mg capsule Take 1 Cap by mouth daily (after breakfast). 30 Cap 2    doxepin (SINEQUAN) 25 mg capsule Take 1 Cap by mouth nightly. 30 Cap 1    meloxicam (MOBIC) 15 mg tablet Take 1 Tab by mouth daily (after breakfast). Take x 1 week and then stop. May use daily after this as needed.  30 Tab 1       Past History     Past Medical History:  Past Medical History:   Diagnosis Date    ADHD     diagnosed at age 10yo   [place] Anxiety     B[facility]     Depression     Learning disorder     Paranoid behavior ([facility]     Schizophrenia ([facility]        Past Surgical History:  Past Surgical History:   Procedure Laterality Date    HX ORTHOPAEDIC      left foot       Family History:  Family History   Problem Relation Age of Onset    Diabetes Mother     Hypertension Mother     Psychiatric Disorder Mother     Mental Retardation Mother     Heart Disease Maternal Grandmother     Hypertension Father     No Known Problems Sister     No Known Problems Brother     No Known Problems Sister        Social History:  Social History     Tobacco Use  Smoking status: Current Every Day Smoker     Packs/day: 1.00     Types: Cigarettes    Smokeless tobacco: Never Used   Substance Use Topics    Alcohol use: No    Drug use: No       Allergies: Allergies   Allergen Reactions    Other Plant, Animal, Environmental Other (comments)     Environmental Allergy-Stuffy Nose, Eyes Watery and Puffy         Review of Systems   Review of Systems   Constitutional: Negative for chills and fever. HENT: Negative for congestion, ear discharge, rhinorrhea and sore throat. Eyes: Positive for pain, discharge, redness and itching. Negative for photophobia and visual disturbance. Respiratory: Negative for cough. Cardiovascular: Negative for chest pain. Musculoskeletal: Negative for arthralgias. Skin: Negative for rash. All other systems reviewed and are negative. Physical Exam     Vitals:    05/25/21 1141   BP: 122/64   Pulse: 61   Resp: 18   Temp: 98.1 °F (36.7 °C)   SpO2: 98%   Weight: 59 kg (130 lb)   Height: 5' 9\" (1.753 m)     Physical Exam  Vitals and nursing note reviewed. Constitutional:       General: He is not in acute distress. Appearance: He is well-developed. He is not ill-appearing. HENT:      Head: Normocephalic and atraumatic. Right Ear: Tympanic membrane, ear canal and external ear normal.      Left Ear: Tympanic membrane, ear canal and external ear normal.      Nose: Nose normal.      Mouth/Throat:      Pharynx: No oropharyngeal exudate. Eyes:      General: Lids are normal. Vision grossly intact. Right eye: No discharge or hordeolum. Left eye: Discharge present. No hordeolum. Extraocular Movements: Extraocular movements intact. Conjunctiva/sclera:      Right eye: No chemosis. Left eye: Left conjunctiva is injected. No chemosis or hemorrhage. Pupils: Pupils are equal, round, and reactive to light. Cardiovascular:      Rate and Rhythm: Normal rate and regular rhythm.       Heart sounds: Normal heart sounds. Pulmonary:      Effort: Pulmonary effort is normal. No respiratory distress. Breath sounds: Normal breath sounds. No wheezing. Musculoskeletal:      Cervical back: Normal range of motion and neck supple. Lymphadenopathy:      Cervical: No cervical adenopathy. Neurological:      Mental Status: He is alert and oriented to person, place, and time. Diagnostic Study Results     Labs -   No results found for this or any previous visit (from the past 12 hour(s)). Radiologic Studies -   No orders to display     CT Results  (Last 48 hours)    None        CXR Results  (Last 48 hours)    None            Medical Decision Making   I am the first provider for this patient. I reviewed the vital signs, available nursing notes, past medical history, past surgical history, family history and social history. Vital Signs-Reviewed the patient's vital signs. Records Reviewed: Nursing Notes and Old Medical Records    Provider Notes (Medical Decision Making):   DDX: viral vs bacterial vs allergic conjunctivitis,          Disposition:  Discharge    DISCHARGE NOTE:   12:29 PM      Care plan outlined and precautions discussed. Patient has no new complaints, changes, or physical findings. All of pt's questions and concerns were addressed. Patient was instructed and agrees to follow up with opthalmology as well as to return to the ED upon further deterioration. Patient is ready to go home. Follow-up Information    None         Current Discharge Medication List          Procedures:  Procedures    Please note that this dictation was completed with Dragon, computer voice recognition software. Quite often unanticipated grammatical, syntax, homophones, and other interpretive errors are inadvertently transcribed by the computer software. Please disregard these errors. Additionally, please excuse any errors that have escaped final proofreading.     Diagnosis     Clinical Impression: No diagnosis found.

## 2022-03-19 PROBLEM — F81.9 LEARNING DISORDER: Status: ACTIVE | Noted: 2018-11-12

## 2022-03-20 PROBLEM — F20.9 SCHIZOPHRENIA (HCC): Status: ACTIVE | Noted: 2018-11-12

## 2023-12-07 ENCOUNTER — HOSPITAL ENCOUNTER (EMERGENCY)
Facility: HOSPITAL | Age: 38
Discharge: HOME | End: 2023-12-07

## 2023-12-07 VITALS
WEIGHT: 134 LBS | BODY MASS INDEX: 20.31 KG/M2 | HEART RATE: 96 BPM | RESPIRATION RATE: 24 BRPM | TEMPERATURE: 98.8 F | DIASTOLIC BLOOD PRESSURE: 73 MMHG | HEIGHT: 68 IN | SYSTOLIC BLOOD PRESSURE: 137 MMHG | OXYGEN SATURATION: 100 %

## 2023-12-07 DIAGNOSIS — J10.1 INFLUENZA A: Primary | ICD-10-CM

## 2023-12-07 LAB
DEPRECATED S PYO AG THROAT QL EIA: NEGATIVE
FLUAV AG NPH QL IA: POSITIVE
FLUBV AG NOSE QL IA: NEGATIVE

## 2023-12-07 PROCEDURE — 96372 THER/PROPH/DIAG INJ SC/IM: CPT

## 2023-12-07 PROCEDURE — 87880 STREP A ASSAY W/OPTIC: CPT

## 2023-12-07 PROCEDURE — 99284 EMERGENCY DEPT VISIT MOD MDM: CPT

## 2023-12-07 PROCEDURE — 87635 SARS-COV-2 COVID-19 AMP PRB: CPT

## 2023-12-07 PROCEDURE — 87070 CULTURE OTHR SPECIMN AEROBIC: CPT

## 2023-12-07 PROCEDURE — 6360000002 HC RX W HCPCS

## 2023-12-07 PROCEDURE — 6370000000 HC RX 637 (ALT 250 FOR IP)

## 2023-12-07 PROCEDURE — 87804 INFLUENZA ASSAY W/OPTIC: CPT

## 2023-12-07 RX ORDER — IBUPROFEN 800 MG/1
800 TABLET ORAL EVERY 8 HOURS PRN
Qty: 30 TABLET | Refills: 0 | Status: SHIPPED | OUTPATIENT
Start: 2023-12-07 | End: 2023-12-17

## 2023-12-07 RX ORDER — LEVOCETIRIZINE DIHYDROCHLORIDE 5 MG/1
5 TABLET, FILM COATED ORAL NIGHTLY
Qty: 30 TABLET | Refills: 0 | Status: SHIPPED | OUTPATIENT
Start: 2023-12-07

## 2023-12-07 RX ORDER — ONDANSETRON 4 MG/1
4 TABLET, ORALLY DISINTEGRATING ORAL ONCE
Status: COMPLETED | OUTPATIENT
Start: 2023-12-07 | End: 2023-12-07

## 2023-12-07 RX ORDER — OSELTAMIVIR PHOSPHATE 75 MG/1
75 CAPSULE ORAL 2 TIMES DAILY
Qty: 10 CAPSULE | Refills: 0 | Status: SHIPPED | OUTPATIENT
Start: 2023-12-07 | End: 2023-12-12

## 2023-12-07 RX ORDER — KETOROLAC TROMETHAMINE 30 MG/ML
30 INJECTION, SOLUTION INTRAMUSCULAR; INTRAVENOUS ONCE
Status: COMPLETED | OUTPATIENT
Start: 2023-12-07 | End: 2023-12-07

## 2023-12-07 RX ADMIN — KETOROLAC TROMETHAMINE 30 MG: 30 INJECTION, SOLUTION INTRAMUSCULAR at 20:39

## 2023-12-07 RX ADMIN — ONDANSETRON 4 MG: 4 TABLET, ORALLY DISINTEGRATING ORAL at 20:38

## 2023-12-07 ASSESSMENT — ENCOUNTER SYMPTOMS
EYE REDNESS: 0
NAUSEA: 1
SINUS PRESSURE: 1
SHORTNESS OF BREATH: 1
RHINORRHEA: 1
VOMITING: 0
ABDOMINAL PAIN: 0
COUGH: 1
SORE THROAT: 1
CHEST TIGHTNESS: 0

## 2023-12-07 ASSESSMENT — PAIN - FUNCTIONAL ASSESSMENT: PAIN_FUNCTIONAL_ASSESSMENT: 0-10

## 2023-12-07 ASSESSMENT — LIFESTYLE VARIABLES
HOW OFTEN DO YOU HAVE A DRINK CONTAINING ALCOHOL: NEVER
HOW MANY STANDARD DRINKS CONTAINING ALCOHOL DO YOU HAVE ON A TYPICAL DAY: PATIENT DOES NOT DRINK

## 2023-12-07 ASSESSMENT — PAIN DESCRIPTION - LOCATION
LOCATION: GENERALIZED
LOCATION: GENERALIZED

## 2023-12-07 ASSESSMENT — PAIN SCALES - GENERAL: PAINLEVEL_OUTOF10: 8

## 2023-12-07 ASSESSMENT — PAIN DESCRIPTION - ORIENTATION: ORIENTATION: RIGHT;LEFT

## 2023-12-07 ASSESSMENT — PAIN DESCRIPTION - DESCRIPTORS: DESCRIPTORS: ACHING

## 2023-12-07 NOTE — ED TRIAGE NOTES
Pt presents with multiple layers of clothes. Pt reports cough, body aches, HA, sore throat starting yesterday. Pt states \"I just don't feel good. \"

## 2023-12-08 LAB
SARS-COV-2 RNA RESP QL NAA+PROBE: NOT DETECTED
SOURCE: NORMAL

## 2023-12-08 NOTE — DISCHARGE INSTRUCTIONS
You were seen in the ER today for your flu-like symptoms. How long your symptoms last and you feel bad can vary and sometimes can last up to a week or more. Thankfully, your vital signs and assessment were reassuring that you do not appear seriously ill. You can alternate Tylenol and Ibuprofen for headaches, fever, body aches unless you are unable to take those medications for some reason such as allergy to the medicine or you take blood thinners. Drinking plenty of water to keep hydrated which aids in recovery. Medications such as Mucinex or Robitussin are helpful for cough and if you smoke, quitting or avoiding can help decrease your respiratory symptoms. Return to the ER or seek emergency care if you develop a fever that does not break with medication or lasts longer than 5 days, shortness of breath or chest pain, vomiting or diarrhea that prevents you from keeping fluids down, dizziness, weakness, vision changes, abdominal pain that does not improve with medications, sore throat that prevents you from swallowing or changes your voice quality or any other concerning symptoms.

## 2023-12-08 NOTE — ED NOTES

## 2023-12-09 LAB
BACTERIA SPEC CULT: NORMAL
SERVICE CMNT-IMP: NORMAL